# Patient Record
Sex: FEMALE | Race: WHITE | NOT HISPANIC OR LATINO | Employment: OTHER | URBAN - METROPOLITAN AREA
[De-identification: names, ages, dates, MRNs, and addresses within clinical notes are randomized per-mention and may not be internally consistent; named-entity substitution may affect disease eponyms.]

---
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
## 2024-02-13 ENCOUNTER — HOSPITAL ENCOUNTER (EMERGENCY)
Facility: CLINIC | Age: 19
Discharge: HOME | End: 2024-02-13
Attending: FAMILY MEDICINE | Admitting: FAMILY MEDICINE
Payer: COMMERCIAL

## 2024-02-13 VITALS
WEIGHT: 140.2 LBS | OXYGEN SATURATION: 100 % | RESPIRATION RATE: 18 BRPM | HEIGHT: 69 IN | SYSTOLIC BLOOD PRESSURE: 122 MMHG | BODY MASS INDEX: 20.76 KG/M2 | DIASTOLIC BLOOD PRESSURE: 80 MMHG | HEART RATE: 84 BPM | TEMPERATURE: 98.4 F

## 2024-02-13 DIAGNOSIS — J02.0 STREPTOCOCCAL SORE THROAT: ICD-10-CM

## 2024-02-13 LAB — GROUP A STREP BY PCR: DETECTED

## 2024-02-13 PROCEDURE — 99283 EMERGENCY DEPT VISIT LOW MDM: CPT | Performed by: FAMILY MEDICINE

## 2024-02-13 PROCEDURE — 87651 STREP A DNA AMP PROBE: CPT | Performed by: FAMILY MEDICINE

## 2024-02-13 PROCEDURE — 250N000013 HC RX MED GY IP 250 OP 250 PS 637: Performed by: FAMILY MEDICINE

## 2024-02-13 RX ORDER — AMOXICILLIN 500 MG/1
500 CAPSULE ORAL 3 TIMES DAILY
Qty: 30 CAPSULE | Refills: 0 | Status: SHIPPED | OUTPATIENT
Start: 2024-02-13 | End: 2024-02-23

## 2024-02-13 RX ORDER — ACETAMINOPHEN 500 MG
1000 TABLET ORAL ONCE
Status: COMPLETED | OUTPATIENT
Start: 2024-02-13 | End: 2024-02-13

## 2024-02-13 RX ADMIN — Medication 1 LOZENGE: at 11:38

## 2024-02-13 RX ADMIN — ACETAMINOPHEN 1000 MG: 500 TABLET ORAL at 11:38

## 2024-02-13 ASSESSMENT — ACTIVITIES OF DAILY LIVING (ADL): ADLS_ACUITY_SCORE: 33

## 2024-02-13 NOTE — LETTER
February 13, 2024      To Whom It May Concern:      Jaelyn Onofre was seen in our Emergency Department today, 02/13/24.  I expect her condition to improve over the next 2-3 days.  She may return to work/school when improved.    Sincerely,        Vanessa Carreno RN

## 2024-02-13 NOTE — ED PROVIDER NOTES
"    [facility] EMERGENCY DEPARTMENT ([facility])  2/13/24        History     Chief Complaint   Patient presents with    Pharyngitis     States \"I think I have strep throat.\"     HPI  Jaelyn Onofre is a 18 year old female who presents to the Emergency Department for evaluation of pharyngitis.  3 days ago she developed a mild sore throat, fatigue, chills.  Last night she had a fever.  This morning when she looked she saw swelling of her tonsils and white spots in the back of her throat.  No definite contagious contacts.  She has been more fatigued.  No abdominal pain.  No rash.  No recent travel.  She is still able to swallow.    Past Medical History  History reviewed. No pertinent past medical history.  History reviewed. No pertinent surgical history.  amoxicillin (AMOXIL) 500 MG capsule      No Known Allergies  Family History  History reviewed. No pertinent family history.  Social History   Social History     Tobacco Use    Smoking status: Never    Smokeless tobacco: Never   Substance Use Topics    Alcohol use: Not Currently    Drug use: Not Currently         A medically appropriate review of systems was performed with pertinent positives and negatives noted in the HPI, and all other systems negative.    Physical Exam   BP: 133/85  Pulse: 90  Temp: 98.4  F (36.9  C)  Resp: 18  Height: 175.3 cm (5' 9\")  Weight: 63.6 kg (140 lb 3.2 oz)  SpO2: 99 %  Physical Exam  Vitals and nursing note reviewed.   Constitutional:       General: She is not in acute distress.     Appearance: Normal appearance. She is not diaphoretic.   HENT:      Head: Atraumatic.      Mouth/Throat:      Mouth: Mucous membranes are moist.      Pharynx: Uvula midline. Oropharyngeal exudate and posterior oropharyngeal erythema present. No pharyngeal swelling or uvula swelling.      Tonsils: Tonsillar exudate present. 1+ on the right. 1+ on the left.   Eyes:      General: No scleral icterus.     Conjunctiva/sclera: Conjunctivae normal.   Neck:    "   Thyroid: No thyroid mass or thyromegaly.      Trachea: Trachea and phonation normal.   Cardiovascular:      Rate and Rhythm: Normal rate.      Heart sounds: Normal heart sounds.   Pulmonary:      Effort: No respiratory distress.      Breath sounds: Normal breath sounds.   Abdominal:      General: Abdomen is flat.      Tenderness: There is abdominal tenderness in the left lower quadrant. There is no guarding or rebound.   Musculoskeletal:      Cervical back: Neck supple.   Lymphadenopathy:      Cervical: Cervical adenopathy present.      Right cervical: Superficial cervical adenopathy present.      Left cervical: Superficial cervical adenopathy present.   Skin:     General: Skin is warm.      Findings: No rash.   Neurological:      Mental Status: She is alert.           ED Course, Procedures, & Data      Procedures                      Results for orders placed or performed during the hospital encounter of 02/13/24   Group A Streptococcus PCR Throat Swab     Status: Abnormal    Specimen: Throat; Swab   Result Value Ref Range    Group A strep by PCR Detected (A) Not Detected    Narrative    The Xpert Xpress Strep A test, performed on the [employer] Systems, is a rapid, qualitative in vitro diagnostic test for the detection of Streptococcus pyogenes (Group A ß-hemolytic Streptococcus, Strep A) in throat swab specimens from patients with signs and symptoms of pharyngitis. The Xpert Xpress Strep A test can be used as an aid in the diagnosis of Group A Streptococcal pharyngitis. The assay is not intended to monitor treatment for Group A Streptococcus infections. The Xpert Xpress Strep A test utilizes an automated real-time polymerase chain reaction (PCR) to detect Streptococcus pyogenes DNA.     Medications   acetaminophen (TYLENOL) tablet 1,000 mg (1,000 mg Oral $Given 2/13/24 1138)   benzocaine-menthol (CHLORASEPTIC) 6-10 MG lozenge 1 lozenge (1 lozenge Buccal $Given 2/13/24 1138)     Labs Ordered and  Resulted from Time of ED Arrival to Time of ED Departure   GROUP A STREPTOCOCCUS PCR THROAT SWAB - Abnormal       Result Value    Group A strep by PCR Detected (*)      No orders to display          Critical care was not performed.     Medical Decision Making  The patient's presentation was of moderate complexity (an acute illness with systemic symptoms).    The patient's evaluation involved:  ordering and/or review of 1 test(s) in this encounter (see separate area of note for details)    The patient's management necessitated moderate risk (prescription drug management including medications given in the ED).    Assessment & Plan    Previously healthy 18-year-old female presenting with 3-day history of sore throat now tonsillar exudates and fever.  Initial differential diagnosis includes tonsillo-pharyngitis due to group A beta-hemolytic strep or other bacterial etiology, infectious mononucleosis, other viral pharyngitis, peritonsillar abscess, retropharyngeal abscess, Farshad's angina, epiglottitis, thyroiditis, less likely meningitis, referred pain.  On exam, the patient has evidence of tonsillo-pharyngitis.  There is no sign of Reina-tonsillar abscess, Retropharyngeal abscess, Farshad's Angina, Epiglottitis, thyroid tenderness or Meningitis.    Rapid strep is positive.  Patient elects to take oral antibiotics.  She appears appropriate for outpatient level of care.  We discussed the indications for emergency department return and follow-up.  Stable for discharge.      I have reviewed the nursing notes. I have reviewed the findings, diagnosis, plan and need for follow up with the patient.    Discharge Medication List as of 2/13/2024 11:53 AM        START taking these medications    Details   amoxicillin (AMOXIL) 500 MG capsule Take 1 capsule (500 mg) by mouth 3 times daily for 10 days, Disp-30 capsule, R-0, Local Print             Final diagnoses:   Streptococcal sore throat       Jesus Prasad MD  [facility]  [facility] EMERGENCY DEPARTMENT  2/13/2024        Jesus Prasad MD  02/13/24 1539

## 2024-02-13 NOTE — DISCHARGE INSTRUCTIONS
Thank you for choosing [facility].     Please closely monitor for further symptoms. Return to the Emergency Department if you develop any new or worsening signs or symptoms.    If you received any opiate pain medications or sedatives during your visit, please do not drive for at least 8 hours.     Labs, cultures or final xray interpretations may still need to be reviewed.  We will call you if your plan of care needs to be changed.    Please follow up with your primary care physician or clinic.

## 2024-02-13 NOTE — ED TRIAGE NOTES
Triage Assessment (Adult)       Row Name 02/13/24 0843          Triage Assessment    Airway WDL WDL        Respiratory WDL    Respiratory WDL WDL        Skin Circulation/Temperature WDL    Skin Circulation/Temperature WDL WDL        Cardiac WDL    Cardiac WDL WDL        Peripheral/Neurovascular WDL    Peripheral Neurovascular WDL WDL        Cognitive/Neuro/Behavioral WDL    Cognitive/Neuro/Behavioral WDL WDL

## 2024-03-06 RX ORDER — ETONOGESTREL AND ETHINYL ESTRADIOL VAGINAL RING .015; .12 MG/D; MG/D
1 RING VAGINAL SEE ADMIN INSTRUCTIONS
Qty: 3 EACH | Refills: 0 | Status: SHIPPED | OUTPATIENT
Start: 2024-03-06 | End: 2024-03-20

## 2024-03-11 ENCOUNTER — HOSPITAL ENCOUNTER (EMERGENCY)
Facility: CLINIC | Age: 19
Discharge: HOME | End: 2024-03-11
Admitting: PHYSICIAN ASSISTANT

## 2024-03-11 VITALS
SYSTOLIC BLOOD PRESSURE: 110 MMHG | WEIGHT: 136 LBS | TEMPERATURE: 98.4 F | OXYGEN SATURATION: 98 % | RESPIRATION RATE: 18 BRPM | HEART RATE: 86 BPM | DIASTOLIC BLOOD PRESSURE: 77 MMHG | BODY MASS INDEX: 20.08 KG/M2

## 2024-03-11 DIAGNOSIS — J02.0 RECURRENT STREPTOCOCCAL PHARYNGITIS: ICD-10-CM

## 2024-03-11 LAB — GROUP A STREP BY PCR: DETECTED

## 2024-03-11 PROCEDURE — 87651 STREP A DNA AMP PROBE: CPT | Performed by: FAMILY MEDICINE

## 2024-03-11 PROCEDURE — 99283 EMERGENCY DEPT VISIT LOW MDM: CPT | Performed by: PHYSICIAN ASSISTANT

## 2024-03-11 ASSESSMENT — ACTIVITIES OF DAILY LIVING (ADL): ADLS_ACUITY_SCORE: 33

## 2024-03-11 ASSESSMENT — COLUMBIA-SUICIDE SEVERITY RATING SCALE - C-SSRS
1. IN THE PAST MONTH, HAVE YOU WISHED YOU WERE DEAD OR WISHED YOU COULD GO TO SLEEP AND NOT WAKE UP?: NO
2. HAVE YOU ACTUALLY HAD ANY THOUGHTS OF KILLING YOURSELF IN THE PAST MONTH?: NO
6. HAVE YOU EVER DONE ANYTHING, STARTED TO DO ANYTHING, OR PREPARED TO DO ANYTHING TO END YOUR LIFE?: NO

## 2024-03-11 NOTE — ED TRIAGE NOTES
Triage Assessment (Adult)       Row Name 03/11/24 1444          Triage Assessment    Airway WDL WDL        Respiratory WDL    Respiratory WDL WDL        Skin Circulation/Temperature WDL    Skin Circulation/Temperature WDL WDL        Cardiac WDL    Cardiac WDL WDL        Peripheral/Neurovascular WDL    Peripheral Neurovascular WDL WDL        Cognitive/Neuro/Behavioral WDL    Cognitive/Neuro/Behavioral WDL WDL

## 2024-03-11 NOTE — ED PROVIDER NOTES
ED Provider Note  [facility]      History     Chief Complaint   Patient presents with    Pharyngitis     Sore throat, tonsils swollen and with exudate, right ear pain, fever of 103.  Symptoms started yesterday.     HPI  Jaelyn Onofre is a 18 year old female who presents emergency department tonight with concerns for sore throat.  She is here with her friend.  Patient states that starting yesterday she started developing sore throat symptoms with Tmax 103 at home.  She reports some bilateral ear discomfort with this.  She is not having runny nose or cough no dyspnea chest pain abdominal pain vomiting.  She is tolerating p.o.    Per EMR patient seen recently 2/13/2024 with concern for sore throat.  She was put on amoxicillin at that time states she did have complete resolution of her symptoms with the amoxicillin.  She reports a history of recurrent strep and states she is scheduled to have tonsillectomy later this summer when she returns back to [place], she is a college student.  She has been taking Advil so far for symptoms including prior to arrival.  No chronic medical problems.    Past Medical History  No past medical history on file.  No past surgical history on file.  amoxicillin-clavulanate (AUGMENTIN) 875-125 MG tablet      No Known Allergies  Family History  No family history on file.  Social History   Social History     Tobacco Use    Smoking status: Never    Smokeless tobacco: Never   Substance Use Topics    Alcohol use: Not Currently    Drug use: Not Currently         A medically appropriate review of systems was performed with pertinent positives and negatives noted in the HPI, and all other systems negative.    Physical Exam   BP: 106/72  Pulse: 88  Temp: 98.4  F (36.9  C)  Resp: 16  Weight: 61.7 kg (136 lb)  SpO2: 97 %  Physical Exam      GENERAL APPEARANCE: The patient is well developed, well appearing, and in no acute distress.  HEAD:  Normocephalic and atraumatic.    EENT: Voice normal.  Patient has visible tonsillar exudates present, tonsils not significantly enlarged, no drooling or hot potato voice or muffled voice.  No trismus.  Tonsils 1+ bilaterally.  NECK: Trachea is midline.patient reports right-sided anterior cervical adenopathy without any left-sided adenopathy.  No significant edema noted.  LUNGS: Breath sounds are equal and clear bilaterally. No wheezes, rhonchi, or rales.  HEART: Regular rate and normal rhythm.  Radial pulses 2+ bilaterally.  ABDOMEN: Soft, flat, and benign. No mass, tenderness, guarding, or rebound.Bowel sounds are present.  EXTREMITIES: No cyanosis, clubbing, or edema.  NEUROLOGIC: No focal sensory or motor deficits are noted.  PSYCHIATRIC: The patient is awake, alert.  Appropriate mood and affect.  SKIN: Warm, dry, and well perfused. Good turgor.      ED Course, Procedures, & Data        Results for orders placed or performed during the hospital encounter of 03/11/24   Group A Streptococcus PCR Throat Swab     Status: Abnormal    Specimen: Throat; Swab   Result Value Ref Range    Group A strep by PCR Detected (A) Not Detected    Narrative    The Xpert Xpress Strep A test, performed on the [employer], is a rapid, qualitative in vitro diagnostic test for the detection of Streptococcus pyogenes (Group A ß-hemolytic Streptococcus, Strep A) in throat swab specimens from patients with signs and symptoms of pharyngitis. The Xpert Xpress Strep A test can be used as an aid in the diagnosis of Group A Streptococcal pharyngitis. The assay is not intended to monitor treatment for Group A Streptococcus infections. The Xpert Xpress Strep A test utilizes an automated real-time polymerase chain reaction (PCR) to detect Streptococcus pyogenes DNA.     Medications - No data to display  Labs Ordered and Resulted from Time of ED Arrival to Time of ED Departure   GROUP A STREPTOCOCCUS PCR THROAT SWAB - Abnormal       Result Value    Group A strep  by PCR Detected (*)      No orders to display          Critical care was not performed.     Medical Decision Making  The patient's presentation was of low complexity (an acute and uncomplicated illness or injury).    The patient's evaluation involved:  ordering and/or review of 1 test(s) in this encounter (see separate area of note for details)    The patient's management necessitated moderate risk (prescription drug management including medications given in the ED).    Assessment & Plan    This is a 18-year-old healthy female with history of recently diagnosed strep throat presenting with concerns for recurrence of sore throat symptoms after complete resolution of symptoms with amoxicillin.  On presentation here she is afebrile she is normoxic she is not tachycardic.  She does have visible bilateral tonsillar exudates without any findings to suggest RPA or PTA.  She is tolerating oral end and has well-controlled pain at this time.  Differential considered includes possibility of recurrent strep viral pharyngitis mono amongst others.  Patient declines any medication for pain here in the ER.    Strep was obtained from triage returns positive.  This was discussed with the patient.  With a recent history of strep total resolution of symptoms and now recurrent symptoms I discussed with patient recommendations of expanding antibiotic coverage with Augmentin she was initially put on amoxicillin.  10-day course given to patient and she was instructed to start this today.  She will be following up with ENT when she returns back home to [place].  We discussed findings to suggest RPA PTA worsening infection for which she would need to return back to the ER.  We discussed conservative measures.  She was given a school note.  Patient has no other questions or concerns at this time.  Red flag signs were addressed, and they were in agreement with the patient care plan provided.    I have reviewed the nursing notes. I have  reviewed the findings, diagnosis, plan and need for follow up with the patient.    Discharge Medication List as of 3/11/2024  3:53 PM        START taking these medications    Details   amoxicillin-clavulanate (AUGMENTIN) 875-125 MG tablet Take 1 tablet by mouth 2 times daily for 10 days, Disp-20 tablet, R-0, Local Print             Final diagnoses:   Recurrent streptococcal pharyngitis       TERESA Howard  [facility] EMERGENCY DEPARTMENT  3/11/2024

## 2024-03-11 NOTE — Clinical Note
Jemima was seen and treated in our emergency department on 3/11/2024.  She may return to school on 03/12/2024.  Patient may return to school with improvement of symptoms for 24 hours and no fever for 24 hours.    If you have any questions or concerns, please don't hesitate to call.      Tracey Stinson PA-C

## 2024-03-11 NOTE — DISCHARGE INSTRUCTIONS
Here in the emergency room we did repeat your strep test and this again was positive consistent with a recurrent strep infection.  You are given prescription for Augmentin antibiotic and you should start this today.  Recommend Tylenol ibuprofen pushing fluids salt water gargles.  You should stay home from work/school until no fever for 24 hours and antibiotics for 24 hours.  I do recommend following up with ear nose and throat when you get back home to [place] with your history of recurrent strep throat difficulties.    If you develop any new or worsening symptoms, is important to return right away to the emergency department for further evaluation and management.

## 2024-03-20 RX ORDER — ETONOGESTREL AND ETHINYL ESTRADIOL VAGINAL RING .015; .12 MG/D; MG/D
RING VAGINAL
Qty: 3 EACH | Refills: 0 | Status: SHIPPED | OUTPATIENT
Start: 2024-03-20

## 2024-05-20 ENCOUNTER — APPOINTMENT (OUTPATIENT)
Dept: FAMILY MEDICINE | Age: 19
End: 2024-05-20

## 2024-06-07 RX ORDER — ETONOGESTREL AND ETHINYL ESTRADIOL VAGINAL RING .015; .12 MG/D; MG/D
RING VAGINAL
Qty: 1 EACH | Refills: 0 | Status: SHIPPED | OUTPATIENT
Start: 2024-06-07

## 2024-06-12 ENCOUNTER — APPOINTMENT (OUTPATIENT)
Dept: FAMILY MEDICINE | Age: 19
End: 2024-06-12

## 2024-08-08 ENCOUNTER — E-ADVICE (OUTPATIENT)
Dept: FAMILY MEDICINE | Age: 19
End: 2024-08-08

## 2024-08-15 ENCOUNTER — TELEPHONE (OUTPATIENT)
Dept: FAMILY MEDICINE | Age: 19
End: 2024-08-15

## 2024-08-15 RX ORDER — ETONOGESTREL AND ETHINYL ESTRADIOL .015; .12 MG/D; MG/D
INSERT, EXTENDED RELEASE VAGINAL
Qty: 1 EACH | Refills: 0 | OUTPATIENT
Start: 2024-08-15

## 2025-02-12 ENCOUNTER — EXTERNAL LAB (OUTPATIENT)
Dept: HEALTH INFORMATION MANAGEMENT | Age: 20
End: 2025-02-12

## 2025-02-12 LAB — HCG SERPL QL: NEGATIVE

## (undated) DEVICE — MMIS - GLOVE SURG 6.5 INDCTR LTX GRN PF SMTH BEAD CUFF

## (undated) DEVICE — MMIS - GLOVE SURG 6.5 BIOGEL LTX STRAW PF BEAD CUFF TXTR

## (undated) DEVICE — MMIS - NEEDLE HPO 19GA 1.5IN STRL LF JELCO NDL-PRO EDGE

## (undated) DEVICE — MMIS - PACK SURG ARTHROSCOPY

## (undated) DEVICE — MMIS - SAFETY STRAPS ARM

## (undated) DEVICE — MMIS - STRAP PSTN 60X3IN DEVON BODY KN

## (undated) DEVICE — MMIS - CUFF TRNQT CYL 30X4IN 2 PORT 1 BLDR POS LOCK CNCT

## (undated) DEVICE — MMIS - PROBE ESURG 135MM 4MM SERFAS ENRG 90-S CRUISE

## (undated) DEVICE — MMIS - BLADE SURG 15 STRL PRSNA +

## (undated) DEVICE — MMIS - SPONGE LAP 18X18IN CTN XRAY DTCT HI ABS PREWASH

## (undated) DEVICE — MMIS - GOWN SURG XL AAMI L3 REINFORCE STRL BACK SET IN

## (undated) DEVICE — MMIS - BLADE SHVR 3.5MM 2 EDGE

## (undated) DEVICE — MMIS - GLOVE SURG 6.5 PROTEXIS ESTM LF CRM PF SMTH BEAD

## (undated) DEVICE — MMIS - ELECTRODE PT RTN C30- LB 9FT CORD NONIRRITATE

## (undated) DEVICE — MMIS - 2% CHLORHEXIDINE SKIN PREP ORANGE 26ML

## (undated) DEVICE — MMIS - GLOVE SURG 7 PROTEXIS LF BLUE PF SMTH BEAD CUFF

## (undated) DEVICE — MMIS - TUBING SUCT/IRR CROSSFLOW INFL CSST ARTHROSCOPY

## (undated) DEVICE — MMIS - DRESSING XEROFORM 1X8 PETROLATUM

## (undated) DEVICE — MMIS - GLOVE SURG 7.5 PROTEXIS LF CRM PF SMTH BEAD CUFF